# Patient Record
Sex: FEMALE | Race: WHITE | NOT HISPANIC OR LATINO | URBAN - METROPOLITAN AREA
[De-identification: names, ages, dates, MRNs, and addresses within clinical notes are randomized per-mention and may not be internally consistent; named-entity substitution may affect disease eponyms.]

---

## 2020-07-02 ENCOUNTER — OFFICE VISIT (OUTPATIENT)
Dept: URBAN - METROPOLITAN AREA SURGERY CENTER 31 | Facility: SURGERY CENTER | Age: 60
End: 2020-07-02

## 2020-07-27 ENCOUNTER — CLAIMS CREATED FROM THE CLAIM WINDOW (OUTPATIENT)
Dept: URBAN - METROPOLITAN AREA SURGERY CENTER 31 | Facility: SURGERY CENTER | Age: 60
End: 2020-07-27
Payer: COMMERCIAL

## 2020-07-27 ENCOUNTER — CLAIMS CREATED FROM THE CLAIM WINDOW (OUTPATIENT)
Dept: URBAN - METROPOLITAN AREA SURGERY CENTER 31 | Facility: SURGERY CENTER | Age: 60
End: 2020-07-27

## 2020-07-27 DIAGNOSIS — Z12.11 COLON CANCER SCREENING: ICD-10-CM

## 2020-07-27 PROCEDURE — 993 AGA: Performed by: INTERNAL MEDICINE

## 2021-11-29 ENCOUNTER — LAB OUTSIDE AN ENCOUNTER (OUTPATIENT)
Dept: URBAN - METROPOLITAN AREA CLINIC 19 | Facility: CLINIC | Age: 61
End: 2021-11-29

## 2021-11-29 ENCOUNTER — WEB ENCOUNTER (OUTPATIENT)
Dept: URBAN - METROPOLITAN AREA CLINIC 19 | Facility: CLINIC | Age: 61
End: 2021-11-29

## 2021-11-29 ENCOUNTER — OFFICE VISIT (OUTPATIENT)
Dept: URBAN - METROPOLITAN AREA CLINIC 19 | Facility: CLINIC | Age: 61
End: 2021-11-29
Payer: COMMERCIAL

## 2021-11-29 VITALS
WEIGHT: 177 LBS | DIASTOLIC BLOOD PRESSURE: 88 MMHG | HEIGHT: 66 IN | TEMPERATURE: 98.2 F | BODY MASS INDEX: 28.45 KG/M2 | SYSTOLIC BLOOD PRESSURE: 130 MMHG | HEART RATE: 77 BPM

## 2021-11-29 DIAGNOSIS — R19.7 DIARRHEA, UNSPECIFIED TYPE: ICD-10-CM

## 2021-11-29 DIAGNOSIS — R11.2 NAUSEA AND VOMITING, INTRACTABILITY OF VOMITING NOT SPECIFIED, UNSPECIFIED VOMITING TYPE: ICD-10-CM

## 2021-11-29 DIAGNOSIS — R10.11 RIGHT UPPER QUADRANT ABDOMINAL PAIN: ICD-10-CM

## 2021-11-29 DIAGNOSIS — K21.9 GASTROESOPHAGEAL REFLUX DISEASE, UNSPECIFIED WHETHER ESOPHAGITIS PRESENT: ICD-10-CM

## 2021-11-29 PROBLEM — 305058001: Status: ACTIVE | Noted: 2021-11-29

## 2021-11-29 PROCEDURE — 99204 OFFICE O/P NEW MOD 45 MIN: CPT | Performed by: INTERNAL MEDICINE

## 2021-11-29 PROCEDURE — 99244 OFF/OP CNSLTJ NEW/EST MOD 40: CPT | Performed by: INTERNAL MEDICINE

## 2021-11-29 NOTE — HPI-TODAY'S VISIT:
The patient was referred by Dr. Dannie Correa for nausea/vomiting and abdominal pain .   A copy of this document is being forwarded to the referring provider.    Ms. Colindres is a 60-year-old female who presents today for nausea, vomiting, diarrhea, weight loss, loss of appetite.  She reports it began about 1.5 months ago when she was eating brisket.  She reports she over ate.  She reports nausea and vomiting with right upper quadrant abdominal pain that radiates into her back.  She reports the nausea and vomiting occurred again about 1 week later and she had continuous right upper quadrant abdominal pain.  She reports she changed her diet and is currently eating more veggies.  She reports she is having less pain but there is still some present in the right upper quadrant.  Pain was worse in the morning.  She reports nausea has been bad for the last 6 months to 1 year.  She reports she has been having increased heartburn recently but is currently not taking anything.  Not currently having any nausea or vomiting now, but reports it comes and goes and she is concerned about having another episode.  She reports having loose bowel movements for 20 years.  She reports her diarrhea is now worse than it ever was.  Diagnosed with microscopic colitis with a colonoscopy in 2012.  Was advised to repeat in 5 years.  No recent antibiotics use.  Reports she currently is having 3-5 loose and watery bowel movements a day.  She reports this occasionally blood, which she has attributed to hemorrhoids.  She denies cardiac/kidney disease, diabetes, blood thinners, and home O2. Last seen by Dr. Arriaga in 2017 for diarrhea- advised to complete stool studies and empirically treat microscopic colitis. Not completed.   Right upper quadrant ultrasound 11/8/2021-nonmobile echogenic focus along the fundus of the gallbladder may represent a polyp or an adherent stone.  No findings are seen to suggest acute cholecystitis.  A short-term follow-up right upper quadrant ultrasound is recommended in 6 months.

## 2021-12-02 ENCOUNTER — TELEPHONE ENCOUNTER (OUTPATIENT)
Dept: URBAN - METROPOLITAN AREA CLINIC 19 | Facility: CLINIC | Age: 61
End: 2021-12-02

## 2021-12-07 LAB
CALPROTECTIN, FECAL: 291
FATS, NEUTRAL: NORMAL
FATS, TOTAL: NORMAL
PANCREATIC ELASTASE, FECAL: (no result)

## 2021-12-08 ENCOUNTER — TELEPHONE ENCOUNTER (OUTPATIENT)
Dept: URBAN - METROPOLITAN AREA CLINIC 19 | Facility: CLINIC | Age: 61
End: 2021-12-08

## 2022-01-18 ENCOUNTER — TELEPHONE ENCOUNTER (OUTPATIENT)
Dept: URBAN - METROPOLITAN AREA CLINIC 19 | Facility: CLINIC | Age: 62
End: 2022-01-18

## 2022-01-26 ENCOUNTER — OFFICE VISIT (OUTPATIENT)
Dept: URBAN - METROPOLITAN AREA CLINIC 19 | Facility: CLINIC | Age: 62
End: 2022-01-26

## 2022-02-01 ENCOUNTER — LAB OUTSIDE AN ENCOUNTER (OUTPATIENT)
Dept: URBAN - METROPOLITAN AREA CLINIC 19 | Facility: CLINIC | Age: 62
End: 2022-02-01

## 2022-02-23 ENCOUNTER — TELEPHONE ENCOUNTER (OUTPATIENT)
Dept: URBAN - METROPOLITAN AREA CLINIC 40 | Facility: CLINIC | Age: 62
End: 2022-02-23

## 2022-02-24 ENCOUNTER — OFFICE VISIT (OUTPATIENT)
Dept: URBAN - METROPOLITAN AREA SURGERY CENTER 31 | Facility: SURGERY CENTER | Age: 62
End: 2022-02-24
Payer: COMMERCIAL

## 2022-02-24 DIAGNOSIS — K21.9 ACID REFLUX: ICD-10-CM

## 2022-02-24 DIAGNOSIS — R10.11 ABDOMINAL BURNING SENSATION IN RIGHT UPPER QUADRANT: ICD-10-CM

## 2022-02-24 DIAGNOSIS — R11.0 AM NAUSEA: ICD-10-CM

## 2022-02-24 PROCEDURE — G8907 PT DOC NO EVENTS ON DISCHARG: HCPCS | Performed by: INTERNAL MEDICINE

## 2022-02-24 PROCEDURE — 45378 DIAGNOSTIC COLONOSCOPY: CPT | Performed by: INTERNAL MEDICINE

## 2022-02-24 PROCEDURE — 43235 EGD DIAGNOSTIC BRUSH WASH: CPT | Performed by: INTERNAL MEDICINE

## 2022-03-01 ENCOUNTER — OFFICE VISIT (OUTPATIENT)
Dept: URBAN - METROPOLITAN AREA CLINIC 19 | Facility: CLINIC | Age: 62
End: 2022-03-01

## 2022-04-18 ENCOUNTER — DASHBOARD ENCOUNTERS (OUTPATIENT)
Age: 62
End: 2022-04-18

## 2022-04-18 ENCOUNTER — WEB ENCOUNTER (OUTPATIENT)
Dept: URBAN - METROPOLITAN AREA CLINIC 19 | Facility: CLINIC | Age: 62
End: 2022-04-18

## 2022-04-18 ENCOUNTER — OFFICE VISIT (OUTPATIENT)
Dept: URBAN - METROPOLITAN AREA CLINIC 19 | Facility: CLINIC | Age: 62
End: 2022-04-18
Payer: COMMERCIAL

## 2022-04-18 DIAGNOSIS — K82.8 SLUDGE IN GALLBLADDER: ICD-10-CM

## 2022-04-18 DIAGNOSIS — K52.839 MICROSCOPIC COLITIS, UNSPECIFIED MICROSCOPIC COLITIS TYPE: ICD-10-CM

## 2022-04-18 DIAGNOSIS — R19.7 DIARRHEA, UNSPECIFIED TYPE: ICD-10-CM

## 2022-04-18 PROCEDURE — 99215 OFFICE O/P EST HI 40 MIN: CPT | Performed by: INTERNAL MEDICINE

## 2022-04-18 RX ORDER — BUDESONIDE 3 MG/1
3 CAPSULES CAPSULE, COATED PELLETS ORAL ONCE A DAY
Qty: 90 | Refills: 1 | OUTPATIENT
Start: 2022-04-18

## 2022-04-18 NOTE — HPI-TODAY'S VISIT:
The patient was referred by Dr. Dannie Correa for nausea/vomiting and abdominal pain .   A copy of this document is being forwarded to the referring provider.    Ms. Colindres is a 60-year-old female who presents today for nausea, vomiting, diarrhea, weight loss, loss of appetite.  She reports it began about 1.5 months ago when she was eating brisket.  She reports she over ate.  She reports nausea and vomiting with right upper quadrant abdominal pain that radiates into her back.  She reports the nausea and vomiting occurred again about 1 week later and she had continuous right upper quadrant abdominal pain.  She reports she changed her diet and is currently eating more veggies.  She reports she is having less pain but there is still some present in the right upper quadrant.  Pain was worse in the morning.  She reports nausea has been bad for the last 6 months to 1 year.  She reports she has been having increased heartburn recently but is currently not taking anything.  Not currently having any nausea or vomiting now, but reports it comes and goes and she is concerned about having another episode.  She reports having loose bowel movements for 20 years.  She reports her diarrhea is now worse than it ever was.  Diagnosed with microscopic colitis with a colonoscopy in 2012.  Was advised to repeat in 5 years.  No recent antibiotics use.  Reports she currently is having 3-5 loose and watery bowel movements a day.  She reports this occasionally blood, which she has attributed to hemorrhoids.  She denies cardiac/kidney disease, diabetes, blood thinners, and home O2. Last seen by Dr. Arriaga in 2017 for diarrhea- advised to complete stool studies and empirically treat microscopic colitis. Not completed.   Right upper quadrant ultrasound 11/8/2021-nonmobile echogenic focus along the fundus of the gallbladder may represent a polyp or an adherent stone.  No findings are seen to suggest acute cholecystitis.  A short-term follow-up right upper quadrant ultrasound is recommended in 6 months. The patient was referred by Dr. Dannie Correa for nausea/vomiting and abdominal pain .   A copy of this document is being forwarded to the referring provider.    Ms. Colindres is a 60-year-old female who presents today for nausea, vomiting, diarrhea, weight loss, loss of appetite.  She reports it began about 1.5 months ago when she was eating brisket.  She reports she over ate.  She reports nausea and vomiting with right upper quadrant abdominal pain that radiates into her back.  She reports the nausea and vomiting occurred again about 1 week later and she had continuous right upper quadrant abdominal pain.  She reports she changed her diet and is currently eating more veggies.  She reports she is having less pain but there is still some present in the right upper quadrant.  Pain was worse in the morning.  She reports nausea has been bad for the last 6 months to 1 year.  She reports she has been having increased heartburn recently but is currently not taking anything.  Not currently having any nausea or vomiting now, but reports it comes and goes and she is concerned about having another episode.  She reports having loose bowel movements for 20 years.  She reports her diarrhea is now worse than it ever was.  Diagnosed with microscopic colitis with a colonoscopy in 2012.  Was advised to repeat in 5 years.  No recent antibiotics use.  Reports she currently is having 3-5 loose and watery bowel movements a day.  She reports this occasionally blood, which she has attributed to hemorrhoids.  She denies cardiac/kidney disease, diabetes, blood thinners, and home O2. Last seen by Dr. Arriaga in 2017 for diarrhea- advised to complete stool studies and empirically treat microscopic colitis. Not completed.   Right upper quadrant ultrasound 11/8/2021-nonmobile echogenic focus along the fundus of the gallbladder may represent a polyp or an adherent stone.  No findings are seen to suggest acute cholecystitis.  A short-term follow-up right upper quadrant ultrasound is recommended in 6 months.  4/18/2022- 6 month followup- lost 25 pounds in 6 months! She has 3 to 7 loose watery BMS every day. Initially it started in 2000's. She now is afraid she will have accidents.  EGD and colonoscopy in Feb 2022- completely normal .

## 2022-04-20 LAB
DEAMIDATED GLIADIN ABS, IGA: 4
DEAMIDATED GLIADIN ABS, IGG: 2
ENDOMYSIAL ANTIBODY IGA: NEGATIVE
IMMUNOGLOBULIN A, QN, SERUM: 134
T-TRANSGLUTAMINASE (TTG) IGA: <2
T-TRANSGLUTAMINASE (TTG) IGG: <2

## 2022-05-06 ENCOUNTER — TELEPHONE ENCOUNTER (OUTPATIENT)
Dept: URBAN - METROPOLITAN AREA CLINIC 19 | Facility: CLINIC | Age: 62
End: 2022-05-06

## 2022-05-16 ENCOUNTER — TELEPHONE ENCOUNTER (OUTPATIENT)
Dept: URBAN - METROPOLITAN AREA CLINIC 19 | Facility: CLINIC | Age: 62
End: 2022-05-16

## 2022-05-16 ENCOUNTER — LAB OUTSIDE AN ENCOUNTER (OUTPATIENT)
Dept: URBAN - METROPOLITAN AREA CLINIC 19 | Facility: CLINIC | Age: 62
End: 2022-05-16

## 2022-05-16 PROBLEM — 235595009: Status: ACTIVE | Noted: 2021-11-29

## 2022-05-16 PROBLEM — 235753003: Status: ACTIVE | Noted: 2022-04-18

## 2022-05-16 PROBLEM — 301717006: Status: ACTIVE | Noted: 2021-11-29

## 2022-05-16 PROBLEM — 16932000: Status: ACTIVE | Noted: 2021-11-29

## 2022-05-16 PROBLEM — 62315008: Status: ACTIVE | Noted: 2021-11-29

## 2022-05-25 ENCOUNTER — TELEPHONE ENCOUNTER (OUTPATIENT)
Dept: URBAN - METROPOLITAN AREA CLINIC 19 | Facility: CLINIC | Age: 62
End: 2022-05-25

## 2022-07-20 ENCOUNTER — TELEPHONE ENCOUNTER (OUTPATIENT)
Dept: URBAN - METROPOLITAN AREA CLINIC 23 | Facility: CLINIC | Age: 62
End: 2022-07-20